# Patient Record
Sex: MALE | Race: WHITE | Employment: FULL TIME | URBAN - METROPOLITAN AREA
[De-identification: names, ages, dates, MRNs, and addresses within clinical notes are randomized per-mention and may not be internally consistent; named-entity substitution may affect disease eponyms.]

---

## 2021-01-28 ENCOUNTER — HOSPITAL ENCOUNTER (OUTPATIENT)
Dept: LAB | Age: 61
Discharge: HOME | End: 2021-01-28
Payer: COMMERCIAL

## 2021-01-28 PROCEDURE — 88305 TISSUE EXAM BY PATHOLOGIST: CPT

## 2021-01-28 PROCEDURE — 88344 IMHCHEM/IMCYTCHM EA MLT ANTB: CPT

## 2022-01-20 ENCOUNTER — HOSPITAL ENCOUNTER (OUTPATIENT)
Dept: PREADMISSION TESTING | Age: 62
Discharge: HOME | End: 2022-01-20
Payer: COMMERCIAL

## 2022-01-20 ENCOUNTER — HOSPITAL ENCOUNTER (OUTPATIENT)
Dept: PREADMISSION TESTING | Age: 62
Discharge: HOME | End: 2022-01-20

## 2022-01-20 LAB
ANION GAP SERPL CALC-SCNC: 4 MMOL/L (ref 3–18)
BUN SERPL-MCNC: 18 MG/DL (ref 7–18)
BUN/CREAT SERPL: 18 (ref 12–20)
CALCIUM SERPL-MCNC: 9.7 MG/DL (ref 8.5–10.1)
CHLORIDE SERPL-SCNC: 107 MMOL/L (ref 100–111)
CO2 SERPL-SCNC: 29 MMOL/L (ref 21–32)
CREAT SERPL-MCNC: 1 MG/DL (ref 0.6–1.3)
ERYTHROCYTE [DISTWIDTH] IN BLOOD BY AUTOMATED COUNT: 13 % (ref 11.6–14.5)
GLUCOSE SERPL-MCNC: 103 MG/DL (ref 74–99)
HCT VFR BLD AUTO: 44.1 % (ref 36–48)
HGB BLD-MCNC: 14.6 G/DL (ref 13–16)
MCH RBC QN AUTO: 29.1 PG (ref 24–34)
MCHC RBC AUTO-ENTMCNC: 33.1 G/DL (ref 31–37)
MCV RBC AUTO: 87.8 FL (ref 78–100)
NRBC # BLD: 0 K/UL (ref 0–0.01)
NRBC BLD-RTO: 0 PER 100 WBC
PLATELET # BLD AUTO: 174 K/UL (ref 135–420)
PMV BLD AUTO: 10.7 FL (ref 9.2–11.8)
POTASSIUM SERPL-SCNC: 4.6 MMOL/L (ref 3.5–5.5)
RBC # BLD AUTO: 5.02 M/UL (ref 4.35–5.65)
SODIUM SERPL-SCNC: 140 MMOL/L (ref 136–145)
WBC # BLD AUTO: 5.8 K/UL (ref 4.6–13.2)

## 2022-01-20 PROCEDURE — 36415 COLL VENOUS BLD VENIPUNCTURE: CPT

## 2022-01-20 PROCEDURE — U0003 INFECTIOUS AGENT DETECTION BY NUCLEIC ACID (DNA OR RNA); SEVERE ACUTE RESPIRATORY SYNDROME CORONAVIRUS 2 (SARS-COV-2) (CORONAVIRUS DISEASE [COVID-19]), AMPLIFIED PROBE TECHNIQUE, MAKING USE OF HIGH THROUGHPUT TECHNOLOGIES AS DESCRIBED BY CMS-2020-01-R: HCPCS

## 2022-01-20 PROCEDURE — 85027 COMPLETE CBC AUTOMATED: CPT

## 2022-01-20 PROCEDURE — 80048 BASIC METABOLIC PNL TOTAL CA: CPT

## 2022-01-21 VITALS — BODY MASS INDEX: 23.4 KG/M2 | HEIGHT: 69 IN | WEIGHT: 158 LBS

## 2022-01-21 LAB — SARS-COV-2, NAA: NOT DETECTED

## 2022-01-21 RX ORDER — BISMUTH SUBSALICYLATE 262 MG
1 TABLET,CHEWABLE ORAL DAILY
COMMUNITY

## 2022-01-21 RX ORDER — ASCORBIC ACID 500 MG
1000 TABLET ORAL DAILY
COMMUNITY

## 2022-01-21 RX ORDER — CEFAZOLIN SODIUM/WATER 2 G/20 ML
2 SYRINGE (ML) INTRAVENOUS ONCE
Status: CANCELLED | OUTPATIENT
Start: 2022-01-21 | End: 2022-01-21

## 2022-01-21 RX ORDER — DM/PE/ACETAMINOPHEN/CHLORPHENR 10-5-325-2
1 TABLET, SEQUENTIAL ORAL DAILY
COMMUNITY

## 2022-01-21 RX ORDER — SODIUM CHLORIDE, SODIUM LACTATE, POTASSIUM CHLORIDE, CALCIUM CHLORIDE 600; 310; 30; 20 MG/100ML; MG/100ML; MG/100ML; MG/100ML
125 INJECTION, SOLUTION INTRAVENOUS CONTINUOUS
Status: CANCELLED | OUTPATIENT
Start: 2022-01-21

## 2022-01-21 NOTE — PERIOP NOTES
No sleep apnea, removable prosthetic devices or family history of malignant hyperthermia. Care fusion kit and instructions given and reviewed. PCP is not aware of the surgery. No participation in clinical trial or research study. Do not bring any valuables on DOS- jewelry, wallet, cash, laptop, medications. CDC guidelines reviewed. Does not meet criteria for special population at this time. Possible time delay day of surgery reviewed. Covid cjkosep-7-. DNR status-none.

## 2022-01-25 ENCOUNTER — ANESTHESIA EVENT (OUTPATIENT)
Dept: SURGERY | Age: 62
End: 2022-01-25
Payer: COMMERCIAL

## 2022-01-25 ENCOUNTER — APPOINTMENT (OUTPATIENT)
Dept: GENERAL RADIOLOGY | Age: 62
End: 2022-01-25
Attending: UROLOGY
Payer: COMMERCIAL

## 2022-01-25 ENCOUNTER — ANESTHESIA (OUTPATIENT)
Dept: SURGERY | Age: 62
End: 2022-01-25
Payer: COMMERCIAL

## 2022-01-25 ENCOUNTER — APPOINTMENT (OUTPATIENT)
Dept: ULTRASOUND IMAGING | Age: 62
End: 2022-01-25
Attending: UROLOGY
Payer: COMMERCIAL

## 2022-01-25 ENCOUNTER — HOSPITAL ENCOUNTER (OUTPATIENT)
Age: 62
Setting detail: OUTPATIENT SURGERY
Discharge: HOME | End: 2022-01-25
Attending: UROLOGY | Admitting: UROLOGY
Payer: COMMERCIAL

## 2022-01-25 VITALS
RESPIRATION RATE: 14 BRPM | HEART RATE: 70 BPM | OXYGEN SATURATION: 99 % | BODY MASS INDEX: 24.51 KG/M2 | WEIGHT: 165.5 LBS | TEMPERATURE: 97.4 F | DIASTOLIC BLOOD PRESSURE: 78 MMHG | SYSTOLIC BLOOD PRESSURE: 119 MMHG | HEIGHT: 69 IN

## 2022-01-25 DIAGNOSIS — R97.20 ELEVATED PSA: ICD-10-CM

## 2022-01-25 DIAGNOSIS — N13.30 HYDRONEPHROSIS, LEFT: Primary | ICD-10-CM

## 2022-01-25 PROBLEM — C61 CANCER OF PROSTATE (HCC): Status: ACTIVE | Noted: 2022-01-25

## 2022-01-25 PROCEDURE — 74011250637 HC RX REV CODE- 250/637: Performed by: ANESTHESIOLOGY

## 2022-01-25 PROCEDURE — 74011000250 HC RX REV CODE- 250: Performed by: ANESTHESIOLOGY

## 2022-01-25 PROCEDURE — 88344 IMHCHEM/IMCYTCHM EA MLT ANTB: CPT

## 2022-01-25 PROCEDURE — 77030020782 HC GWN BAIR PAWS FLX 3M -B: Performed by: UROLOGY

## 2022-01-25 PROCEDURE — 76010000149 HC OR TIME 1 TO 1.5 HR: Performed by: UROLOGY

## 2022-01-25 PROCEDURE — 2709999900 HC NON-CHARGEABLE SUPPLY: Performed by: UROLOGY

## 2022-01-25 PROCEDURE — C1769 GUIDE WIRE: HCPCS | Performed by: UROLOGY

## 2022-01-25 PROCEDURE — 74011000636 HC RX REV CODE- 636: Performed by: UROLOGY

## 2022-01-25 PROCEDURE — 74011250636 HC RX REV CODE- 250/636: Performed by: UROLOGY

## 2022-01-25 PROCEDURE — 88305 TISSUE EXAM BY PATHOLOGIST: CPT

## 2022-01-25 PROCEDURE — 76210000024 HC REC RM PH II 2.5 TO 3 HR: Performed by: UROLOGY

## 2022-01-25 PROCEDURE — 76060000033 HC ANESTHESIA 1 TO 1.5 HR: Performed by: UROLOGY

## 2022-01-25 PROCEDURE — 77030013688 US GUIDE NDL PLC

## 2022-01-25 PROCEDURE — 77030012508 HC MSK AIRWY LMA AMBU -A: Performed by: ANESTHESIOLOGY

## 2022-01-25 PROCEDURE — C2617 STENT, NON-COR, TEM W/O DEL: HCPCS | Performed by: UROLOGY

## 2022-01-25 PROCEDURE — 74011250636 HC RX REV CODE- 250/636: Performed by: ANESTHESIOLOGY

## 2022-01-25 PROCEDURE — 74420 UROGRAPHY RTRGR +-KUB: CPT

## 2022-01-25 PROCEDURE — 77030040361 HC SLV COMPR DVT MDII -B: Performed by: UROLOGY

## 2022-01-25 PROCEDURE — 77030018846 HC SOL IRR STRL H20 ICUM -A: Performed by: UROLOGY

## 2022-01-25 PROCEDURE — C1758 CATHETER, URETERAL: HCPCS | Performed by: UROLOGY

## 2022-01-25 PROCEDURE — 76210000006 HC OR PH I REC 0.5 TO 1 HR: Performed by: UROLOGY

## 2022-01-25 DEVICE — VARIABLE LENGTH URETERAL STENT
Type: IMPLANTABLE DEVICE | Site: URETER | Status: FUNCTIONAL
Brand: CONTOUR VL™

## 2022-01-25 RX ORDER — DEXAMETHASONE SODIUM PHOSPHATE 4 MG/ML
INJECTION, SOLUTION INTRA-ARTICULAR; INTRALESIONAL; INTRAMUSCULAR; INTRAVENOUS; SOFT TISSUE AS NEEDED
Status: DISCONTINUED | OUTPATIENT
Start: 2022-01-25 | End: 2022-01-25 | Stop reason: HOSPADM

## 2022-01-25 RX ORDER — NALOXONE HYDROCHLORIDE 0.4 MG/ML
0.1 INJECTION, SOLUTION INTRAMUSCULAR; INTRAVENOUS; SUBCUTANEOUS
Status: CANCELLED | OUTPATIENT
Start: 2022-01-25

## 2022-01-25 RX ORDER — FENTANYL CITRATE 50 UG/ML
25 INJECTION, SOLUTION INTRAMUSCULAR; INTRAVENOUS
Status: CANCELLED | OUTPATIENT
Start: 2022-01-25

## 2022-01-25 RX ORDER — INSULIN LISPRO 100 [IU]/ML
INJECTION, SOLUTION INTRAVENOUS; SUBCUTANEOUS ONCE
Status: CANCELLED | OUTPATIENT
Start: 2022-01-25 | End: 2022-01-25

## 2022-01-25 RX ORDER — HYDROMORPHONE HYDROCHLORIDE 2 MG/ML
2 INJECTION, SOLUTION INTRAMUSCULAR; INTRAVENOUS; SUBCUTANEOUS ONCE
Status: DISCONTINUED | OUTPATIENT
Start: 2022-01-25 | End: 2022-01-25 | Stop reason: HOSPADM

## 2022-01-25 RX ORDER — PHENAZOPYRIDINE HYDROCHLORIDE 100 MG/1
100 TABLET, FILM COATED ORAL
Qty: 10 TABLET | Refills: 0 | Status: SHIPPED | OUTPATIENT
Start: 2022-01-25 | End: 2022-01-28

## 2022-01-25 RX ORDER — FENTANYL CITRATE 50 UG/ML
INJECTION, SOLUTION INTRAMUSCULAR; INTRAVENOUS AS NEEDED
Status: DISCONTINUED | OUTPATIENT
Start: 2022-01-25 | End: 2022-01-25 | Stop reason: HOSPADM

## 2022-01-25 RX ORDER — OXYCODONE AND ACETAMINOPHEN 5; 325 MG/1; MG/1
1 TABLET ORAL AS NEEDED
Status: CANCELLED | OUTPATIENT
Start: 2022-01-25

## 2022-01-25 RX ORDER — CEFAZOLIN SODIUM/WATER 2 G/20 ML
2 SYRINGE (ML) INTRAVENOUS ONCE
Status: COMPLETED | OUTPATIENT
Start: 2022-01-25 | End: 2022-01-25

## 2022-01-25 RX ORDER — LIDOCAINE HYDROCHLORIDE 20 MG/ML
INJECTION, SOLUTION EPIDURAL; INFILTRATION; INTRACAUDAL; PERINEURAL AS NEEDED
Status: DISCONTINUED | OUTPATIENT
Start: 2022-01-25 | End: 2022-01-25 | Stop reason: HOSPADM

## 2022-01-25 RX ORDER — SODIUM CHLORIDE, SODIUM LACTATE, POTASSIUM CHLORIDE, CALCIUM CHLORIDE 600; 310; 30; 20 MG/100ML; MG/100ML; MG/100ML; MG/100ML
50 INJECTION, SOLUTION INTRAVENOUS CONTINUOUS
Status: CANCELLED | OUTPATIENT
Start: 2022-01-25

## 2022-01-25 RX ORDER — TRAMADOL HYDROCHLORIDE 50 MG/1
50 TABLET ORAL
Qty: 20 TABLET | Refills: 0 | Status: SHIPPED | OUTPATIENT
Start: 2022-01-25 | End: 2022-01-30

## 2022-01-25 RX ORDER — HYDROMORPHONE HYDROCHLORIDE 1 MG/ML
0.5 INJECTION, SOLUTION INTRAMUSCULAR; INTRAVENOUS; SUBCUTANEOUS
Status: CANCELLED | OUTPATIENT
Start: 2022-01-25

## 2022-01-25 RX ORDER — ONDANSETRON 2 MG/ML
INJECTION INTRAMUSCULAR; INTRAVENOUS AS NEEDED
Status: DISCONTINUED | OUTPATIENT
Start: 2022-01-25 | End: 2022-01-25 | Stop reason: HOSPADM

## 2022-01-25 RX ORDER — CIPROFLOXACIN 250 MG/1
250 TABLET, FILM COATED ORAL EVERY 12 HOURS
COMMUNITY

## 2022-01-25 RX ORDER — ONDANSETRON 2 MG/ML
4 INJECTION INTRAMUSCULAR; INTRAVENOUS ONCE
Status: CANCELLED | OUTPATIENT
Start: 2022-01-25 | End: 2022-01-25

## 2022-01-25 RX ORDER — MAGNESIUM SULFATE 100 %
4 CRYSTALS MISCELLANEOUS AS NEEDED
Status: CANCELLED | OUTPATIENT
Start: 2022-01-25

## 2022-01-25 RX ORDER — ACETAMINOPHEN 500 MG
1000 TABLET ORAL ONCE
Status: COMPLETED | OUTPATIENT
Start: 2022-01-25 | End: 2022-01-25

## 2022-01-25 RX ORDER — PROPOFOL 10 MG/ML
INJECTION, EMULSION INTRAVENOUS AS NEEDED
Status: DISCONTINUED | OUTPATIENT
Start: 2022-01-25 | End: 2022-01-25 | Stop reason: HOSPADM

## 2022-01-25 RX ORDER — MIDAZOLAM HYDROCHLORIDE 1 MG/ML
INJECTION INTRAMUSCULAR; INTRAVENOUS AS NEEDED
Status: DISCONTINUED | OUTPATIENT
Start: 2022-01-25 | End: 2022-01-25 | Stop reason: HOSPADM

## 2022-01-25 RX ORDER — SODIUM CHLORIDE, SODIUM LACTATE, POTASSIUM CHLORIDE, CALCIUM CHLORIDE 600; 310; 30; 20 MG/100ML; MG/100ML; MG/100ML; MG/100ML
125 INJECTION, SOLUTION INTRAVENOUS CONTINUOUS
Status: DISCONTINUED | OUTPATIENT
Start: 2022-01-25 | End: 2022-01-25 | Stop reason: HOSPADM

## 2022-01-25 RX ORDER — GLYCOPYRROLATE 0.2 MG/ML
INJECTION INTRAMUSCULAR; INTRAVENOUS AS NEEDED
Status: DISCONTINUED | OUTPATIENT
Start: 2022-01-25 | End: 2022-01-25 | Stop reason: HOSPADM

## 2022-01-25 RX ORDER — EPHEDRINE SULFATE/0.9% NACL/PF 50 MG/5 ML
SYRINGE (ML) INTRAVENOUS AS NEEDED
Status: DISCONTINUED | OUTPATIENT
Start: 2022-01-25 | End: 2022-01-25 | Stop reason: HOSPADM

## 2022-01-25 RX ADMIN — Medication 10 MG: at 09:31

## 2022-01-25 RX ADMIN — DEXAMETHASONE SODIUM PHOSPHATE 4 MG: 4 INJECTION, SOLUTION INTRAMUSCULAR; INTRAVENOUS at 09:41

## 2022-01-25 RX ADMIN — ONDANSETRON HYDROCHLORIDE 4 MG: 2 INJECTION INTRAMUSCULAR; INTRAVENOUS at 10:19

## 2022-01-25 RX ADMIN — LIDOCAINE HYDROCHLORIDE 60 MG: 20 INJECTION, SOLUTION INTRAVENOUS at 09:28

## 2022-01-25 RX ADMIN — GLYCOPYRROLATE 0.3 MG: 0.2 INJECTION INTRAMUSCULAR; INTRAVENOUS at 09:43

## 2022-01-25 RX ADMIN — FENTANYL CITRATE 100 MCG: 50 INJECTION, SOLUTION INTRAMUSCULAR; INTRAVENOUS at 09:28

## 2022-01-25 RX ADMIN — Medication 2 G: at 09:30

## 2022-01-25 RX ADMIN — PROPOFOL 150 MG: 10 INJECTION, EMULSION INTRAVENOUS at 09:28

## 2022-01-25 RX ADMIN — MIDAZOLAM HYDROCHLORIDE 2 MG: 1 INJECTION, SOLUTION INTRAMUSCULAR; INTRAVENOUS at 09:21

## 2022-01-25 RX ADMIN — ACETAMINOPHEN 1000 MG: 500 TABLET ORAL at 11:39

## 2022-01-25 NOTE — BRIEF OP NOTE
Brief Postoperative Note    Patient: Abiola Larsen  YOB: 1960  MRN: [id number]    Date of Procedure: 1/25/2022     Pre-Op Diagnosis: PROSTATE CANCER,  LEFT HYDRONEPHROSIS    Post-Op Diagnosis: Same as preoperative diagnosis. Procedure(s):  CYSTOSCOPY,LEFT RETROGRADE PYELOGRAM WITH INTERPRETATION,  LEFT DIAGNOSTIC URETEROSCOPY AND STENT PLACEMENT,  TRANSRECTAL ULTRASOUND, PROSTATE BIOPSY    Surgeon(s):  Snehal Reynolds MD    Surgical Assistant: None    Anesthesia: General     Estimated Blood Loss (mL): Minimal    Complications: None    Specimens:   ID Type Source Tests Collected by Time Destination   1 : PROSTATE BIOPSY- LEFT BASE Preservative Prostate  Snehal Reynolds MD 1/25/2022 8147 Pathology   2 : PROSTATE BIOPSY- LEFT MIDDLE Preservative Prostate  Snehal Reynolds MD 1/25/2022 1000 Pathology   3 : PROSTATE BIOPSY- LEFT APEX Preservative Prostate  Snehal Reynolds MD 1/25/2022 1000 Pathology   4 : PROSTATE BIOPSY- RIGHT BASE Preservative Prostate  Snehal Reynolds MD 1/25/2022 1000 Pathology   5 : PROSTATE BIOPSY- RIGHT MIDDLE Preservative Prostate  Snehal Reynolds MD 1/25/2022 1000 Pathology   6 : PROSTATE BIOPSY- RIGHT APEX Preservative Prostate  Snehal Reynolds MD 1/25/2022 1000 Pathology        Implants:   Implant Name Type Inv. Item Serial No.  Lot No. LRB No. Used Action   STENT URET 4.8FR [id number] PERCFLX HYDR+ SFT PGTL TAPR TIP - [id number]  STENT URET 4.8FR [id number] PERCFLX HYDR+ SFT PGTL TAPR TIP  [facility] [facility]_ [id number] Left 1 Implanted       Drains: * No LDAs found *    Findings: PATIENT WITH A VERY LARGE PROSTATE WITH MEDIAN LOBE. GRADE 2 BLADDER TRABECULATIONS,   A HOOKED TIGHT UO/DISTAL URETER WITH A VERY DILATED DISTAL URETER AND THEN NORMAL CALIBER MIDDLE AND PROXIMAL URETER WITHOUT MASS OR BLUNTING OF CALYCES. PROSTATE WAS 44.5 GRAMS. NO HYPOECHOIC LESIONS     DILATED SV ON RIGHT SIDE.       Electronically Signed by Enedina Arnett MD on 1/25/2022 at 10:41 AM

## 2022-01-25 NOTE — ANESTHESIA POSTPROCEDURE EVALUATION
Post-Anesthesia Evaluation and Assessment    Cardiovascular Function/Vital Signs  Visit Vitals  /72   Pulse 89   Temp 36.3 °C (97.4 °F)   Resp 11   Ht 5' 9\" (1.753 m)   Wt 75.1 kg (165 lb 8 oz)   SpO2 100%   BMI 24.44 kg/m²       Patient is status post Procedure(s):  CYSTOSCOPY,LEFT RETROGRADE PYELOGRAM WITH INTERPRETATION,  LEFT DIAGNOSTIC URETEROSCOPY AND STENT PLACEMENT, PROSTATE ULTRASOUND WITH TRANSRECTAL ULTRASOUND PROSTATE BIOPSY, WITH C-ARM. Nausea/Vomiting: Controlled. Postoperative hydration reviewed and adequate. Pain:  Pain Scale 1: Numeric (0 - 10) (01/25/22 1046)  Pain Intensity 1: 0 (01/25/22 1046)   Managed. Neurological Status:   Neuro (WDL): Exceptions to WDL (01/25/22 1046)   At baseline. Mental Status and Level of Consciousness: Baseline and stable. Pulmonary Status:   O2 Device: None (Room air) (01/25/22 1046)   Adequate oxygenation and airway patent. Complications related to anesthesia: None    Post-anesthesia assessment completed. No concerns. Patient has met all discharge requirements.     Signed By: Tenisha Henley MD

## 2022-01-25 NOTE — PERIOP NOTES
TRANSFER - OUT REPORT:    Verbal report given to Nkechi Paulson RN(name) on Laney Bledsoe  being transferred to phase 2(unit) for routine post - op       Report consisted of patients Situation, Background, Assessment and   Recommendations(SBAR). Information from the following report(s) SBAR, Kardex, OR Summary, Procedure Summary, Intake/Output and MAR was reviewed with the receiving nurse. Lines:   Peripheral IV 01/25/22 Posterior;Right Hand (Active)   Site Assessment Clean, dry, & intact 01/25/22 1047   Phlebitis Assessment 0 01/25/22 1047   Infiltration Assessment 0 01/25/22 1047   Dressing Status Clean, dry, & intact 01/25/22 1047   Dressing Type Transparent;Tape 01/25/22 1047   Hub Color/Line Status Pink; Infusing;Patent 01/25/22 0841        Opportunity for questions and clarification was provided.       Patient transported with:   Registered Nurse  Tech

## 2022-01-25 NOTE — PERIOP NOTES
Reviewed PTA medication list with patient/caregiver and patient/caregiver denies any additional medications. Patient admits to having a responsible adult care for them at home for at least 24 hours after surgery. Patient encouraged to use gown warming system and informed that using said warming gown to regulate body temperature prior to a procedure has been shown to help reduce the risks of blood clots and infection. Patient's pharmacy of choice verified and documented in PTA medication section. Dual skin assessment & fall risk band verification completed with Laura Lozano RN.

## 2022-01-25 NOTE — PERIOP NOTES
TRANSFER - IN REPORT:    Verbal report received from Arcadio Chapa RN(name) on Tone Henriquez  being received from [facility]) for routine progression of care      Report consisted of patients Situation, Background, Assessment and   Recommendations(SBAR). Information from the following report(s) SBAR, Kardex, OR Summary and MAR was reviewed with the receiving nurse. Opportunity for questions and clarification was provided. Assessment completed upon patients arrival to unit and care assumed.            Paged Dr. Andrey Alexandra for pain medications

## 2022-01-25 NOTE — DISCHARGE INSTRUCTIONS
DISCHARGE SUMMARY from Nurse  Increase fluids today  Advance diet as tolerated  Call Dr. Landon Murillo if you develop a fever over 101, chills, uncontrolled nausea, vomiting, and/ or pain. Call Dr. Landon Murillo if you cannot urinate, have large blood clots in urine, or have thick urine      PATIENT INSTRUCTIONS:    After general anesthesia or intravenous sedation, for 24 hours or while taking prescription Narcotics:  · Limit your activities  · Do not drive and operate hazardous machinery  · Do not make important personal or business decisions  · Do  not drink alcoholic beverages  · If you have not urinated within 8 hours after discharge, please contact your surgeon on call. Report the following to your surgeon:  · Excessive pain, swelling, redness or odor of or around the surgical area  · Temperature over 100.5  · Nausea and vomiting lasting longer than 4 hours or if unable to take medications  · Any signs of decreased circulation or nerve impairment to extremity: change in color, persistent  numbness, tingling, coldness or increase pain  · Any questions    What to do at Home:  Recommended activity: Activity as tolerated and no driving for today,     If you experience any of the following symptoms as noted above, please follow up with Dr. Landon Murillo. *  Please give a list of your current medications to your Primary Care Provider. *  Please update this list whenever your medications are discontinued, doses are      changed, or new medications (including over-the-counter products) are added. *  Please carry medication information at all times in case of emergency situations. These are general instructions for a healthy lifestyle:    No smoking/ No tobacco products/ Avoid exposure to second hand smoke  Surgeon General's Warning:  Quitting smoking now greatly reduces serious risk to your health.     Obesity, smoking, and sedentary lifestyle greatly increases your risk for illness    A healthy diet, regular physical exercise & weight monitoring are important for maintaining a healthy lifestyle    You may be retaining fluid if you have a history of heart failure or if you experience any of the following symptoms:  Weight gain of 3 pounds or more overnight or 5 pounds in a week, increased swelling in our hands or feet or shortness of breath while lying flat in bed. Please call your doctor as soon as you notice any of these symptoms; do not wait until your next office visit. The discharge information has been reviewed with the patient and caregiver. The patient and caregiver verbalized understanding. Discharge medications reviewed with the patient and caregiver and appropriate educational materials and side effects teaching were provided.   ___________________________________________________________________________________________________________________________________    Patient armband removed and shredded

## 2022-01-25 NOTE — ANESTHESIA PREPROCEDURE EVALUATION
Relevant Problems   No relevant active problems       Anesthetic History     PONV          Review of Systems / Medical History  Patient summary reviewed, nursing notes reviewed and pertinent labs reviewed    Pulmonary  Within defined limits                 Neuro/Psych   Within defined limits           Cardiovascular  Within defined limits                     GI/Hepatic/Renal  Within defined limits              Endo/Other  Within defined limits           Other Findings              Physical Exam    Airway  Mallampati: II  TM Distance: 4 - 6 cm  Neck ROM: normal range of motion   Mouth opening: Normal     Cardiovascular  Regular rate and rhythm,  S1 and S2 normal,  no murmur, click, rub, or gallop             Dental         Pulmonary  Breath sounds clear to auscultation               Abdominal  GI exam deferred       Other Findings            Anesthetic Plan    ASA: 2  Anesthesia type: general          Induction: Intravenous  Anesthetic plan and risks discussed with: Patient

## 2022-01-25 NOTE — OP NOTES
[facility] [place]  OPERATIVE REPORT    Name:  Yael Woodruff  MR#:   [id number]  :  1960  ACCOUNT #:  [de-identified]  DATE OF SERVICE:  2022    PREOPERATIVE DIAGNOSES:  Prostate cancer and left hydronephrosis. POSTOPERATIVE DIAGNOSES:  Prostate cancer and left hydronephrosis. PROCEDURES PERFORMED:  Cystoscopy, left retrograde pyelogram with interpretation, left diagnostic ureteroscopy and stent placement with prostate ultrasound with prostate biopsy. SURGEON:  Fiorella Garcia MD    ASSISTANT:  None. ANESTHESIA:  General.    COMPLICATIONS:  None. SPECIMENS REMOVED:  Prostate biopsy, left base; prostate biopsy, left middle; prostate biopsy, left apex; prostate biopsy, right base; prostate biopsy, right middle; and prostate biopsy, right apex. IMPLANTS:  A 4.8-Setswana variable-length stent. DRAINS:  None. ESTIMATED BLOOD LOSS:  Minimal.    FINDINGS:  The patient had a very large prostate with significant median lobe. He had grade II bladder trabeculations throughout the bladder. He had a hooked distal ureter/tight UO with a very dilated distal ureter just proximal to the hooking, and then the ureter became a normal caliber by the middle of the ureter and proximal ureter without any mass or blunting of calyces or abnormalities in the kidney itself. The prostate ultrasound was 44.5 g in mass. There were no hypoechoic lesions. There was a dilated seminal vesicles on the right side. CLINICAL NOTE/INDICATIONS:  The patient is a 62-year-old gentleman with a history of prostate cancer, who presents for surveillance biopsy. As part of his active surveillance protocol, an MRI was done and this demonstrated an absent right kidney in it, a dilated left distal ureter. He presents for further evaluation. PROCEDURE:  Preoperatively, the risks and benefits of the surgery were described to the patient.   The risks include, but are not limited to, bleeding, infection, injury to the bladder, injury to the ureter, injury to the kidney, and possible need for future procedures. The patient understood the risks and signed the informed consent. The patient was taken to the operating room and placed on the OR table in supine position. He was administered general anesthetic. He was administered intravenous antibiotics. He was placed in dorsal lithotomy position and prepped and draped in the usual sterile manner. A 22-Nepali resectoscope and sheath were then inserted transurethrally atraumatically under direct vision using a 30-degree lens. The urethra was normal.  When I got to the prostate, the prostate showed significant enlargement with a very large median lobe obstructing. Once in the bladder, there were grade II bladder trabeculations throughout the bladder. There were no stones, no tumors, no areas of concern. The left ureteral orifice was in normal anatomic position. The right ureteral orifice was absent. I then cannulated the left ureteral orifice with a 5-Nepali open-ended ureteral catheter. Retrograde pyelogram was done in the usual manner using 18 mL of Visipaque dye. There were no filling defects. However, there was a little bit of hooking of the distal left ureter. Proximal to this hooking, there was a significantly dilated distal left ureter. However, it tapered down to a normal size by the mid ureter and the proximal ureter, which were completely normal without any filling defects seen. There was no simba hydronephrosis or blunting of calyces just really this very significant hydroureter in the distal aspect only. Next, I passed a Sensor wire through the open-ended catheter up to the kidney. The open-ended catheter was removed. The scope was removed. I then passed a dual-lumen catheter and passed a second Sensor wire up to the kidney. The dual-lumen catheter was removed. One wire was a safety wire and the other wire was a working wire.   Over the working wire, I passed a flexible ureteroscope. When I got to the UO, it was somewhat difficult due to the tightness of it, but I was able to advance my scope by it and up to the kidney. The working wire was removed. Panpyeloscopy was done. Every hodan was entered. There were no tumors, no stones, no areas of concern. Panpyeloscopy was done. I then slowly backed the scope down the ureter. The ureter was intact and unharmed. There were no tumors in the ureter. There were no stones in the ureter. The dilation was persistent, but it was nonobstructive. Next, I removed the scope completely. I reinserted the cystoscope. Over the safety wire, I passed a 4.8-Kyrgyz variable-length stent. The wire was removed. Fluoroscopy confirmed that the proximal coil was in the kidney. The distal coil was noted to be in the bladder by direct vision. At this point, I removed the scope and all of the drapings. I then passed a transrectal ultrasound probe transrectally and imaged the prostate in transverse and longitudinal views. There were no hypoechoic lesions, but there were some dilated seminal vesicles on the right side in particular. The prostate was measured using an ellipsoid formula. It was at 44.5 mL in volume. The prostate biopsies were done with two at the left base and then two in the left mid and two at the left apex in the usual manner under ultrasound guidance. I then did two biopsies at the right base, two biopsies at the right mid, and two biopsies at the right apex. There was minimal bleeding. I did hold transrectal pressure for one minute and then packed the rectum with gauze. At this point, the patient was taken out of lithotomy position and revived from anesthesia, and taken to Recovery in stable condition.       MD GADIEL [email or web address]  D:  01/25/2022 10:52  T:  01/25/2022 11:24  JOB #:  [id number]

## 2022-01-25 NOTE — H&P
Urology [address] [place] [address] [address] [address]  Tel: [phone]  Fax: [phone]    Patient : Vicente Cespedes   YOB: 1960           Assessment/Plan  # Detail Type Description    1. Assessment Cancer of prostate (C61). Patient Plan We again discussed his plight in detail. He is due for a surveillance bx. He has Cipro and Enema on hand. He will proceed with a bx in the OR in the next couple of weeks. Plan Orders Urine Culture, Routine to be performed. Obtained on 01/05/2022.         2. Assessment Hydronephrosis, left (N13.30). Patient Plan He has persistent left hydro ureter on a solitary left kidney. We discussed this could be due to a stricture or it could be due to a mass or it could be a variation of normal.  We discussed how a cysto, left RPG, possible left URS, possible ureteral dilation, biopsy and stent placement is done. We discussed the risk of bleeding, infection, injury and possible need for future procedures, the fact that this is potentially both diagnostic and potentially a curative procedure. He expressed understanding and will proceed. 3. Assessment Enlarged prostate w/ LUTS (N40.1). Patient Plan He is currently voiding okay. 4. Assessment Feeling of incomplete bladder emptying (R39.14). Patient Plan PVR next visit. Additional Visit Information      This 63year old male presents for Prostate Cancer and Hydronephrosis. History of Present Illness  1. Prostate Cancer   The patient is here today for scheduled follow up. The patient's status is stable. The patient  denies chills, diarrhea, a fever, headache, nausea, sexual dysfunction or vomiting. Pertinent history does not include a family history of prostate cancer or a diet high in animal fat.   Additional information: PSA = 4.27 (9/25/2020)     4.3 (11/23/2020)     3.40 (4/2021)   3.73 (7/2021)   4.76 (11/2021)      Pathology (01/28/2021):  [place] 3+3=6 and ASAP. He has been on active surveillance but he leans towards surgery should he need treatment      2022 -- He is doing well overall. No pain or radiation. mri (2021) - No PiRADS lesions, but some inflammation, distal left ureteral narrowing with proximal hydroureter. 2.  Hydronephrosis   Onset was 6 months ago. Severity level is no pain. The problem is with no change. The pain does not radiate. Pertinent negatives include chills, constipation, diarrhea, fever, nausea and vomiting. Additional information: MRI (pelvis [facility], personally reviewed, 2021) = left distal ureteral narrowing with proximal hydro ureter. .            Comments: CT (2021) -- personally reviewed -- absent right kidney, Left distal ureteral narrowing with hydroureter proximal to it and not frak hydronephrosis nor any filling defects otherwise. 2022 -- He is ding well without pain or hematuria. He has a lot of questions about the procedure. Past Medical/Surgical History   (Reviewed, updated)  Disease/disorder Onset Date Management Date Comments   hemorrhoids         hemorrhoids          Problem List  Problem List reviewed. Medications (active prior to today)  Medication Instructions Start Date Stop Date Refilled Elsewhere   Cipro 500 mg tablet take 1 by oral route once for 12 hours start day prior to biopsy 2021   N       Medication Reconciliation  Medications reconciled today. Medication Reviewed  Adherence Medication Name Sig Desc Elsewhere Status   taking as directed Cipro 500 mg tablet take 1 by oral route once for 12 hours start day prior to biopsy N Verified     Allergies  Ingredient Reaction (Severity) Medication Name Comment   NO KNOWN ALLERGIES        Reviewed, no changes.       Family History   (Reviewed, updated)    Relationship Family Member Name  Age at Death Condition Onset Age Cause of Death   Father    Cancer -melanoma 61 (cause of death)      Social History (Reviewed, updated)  Tobacco use reviewed. Preferred language is [place]. Marital Status/Family/Social Support  Marital status:      Tobacco use status: Current non-smoker. Smoking status: Never smoker. Tobacco Screening  Patient has never used tobacco. Patient has not used tobacco in the last 30 days. Patient has not used smokeless tobacco in the last 30 days. Smoking Status  Type Smoking Status Usage Per Day Years Used Pack Years Total Pack Years    Never smoker         Alcohol  There is a history of alcohol use. consumed rarely. Caffeine  The patient uses caffeine: coffee - 4 cups a day. Review of Systems  System Neg/Pos Details   Constitutional Negative Chills and Fever. ENMT Negative Ear infections and Sore throat. Eyes Negative Blurred vision, Double vision and Eye pain. Respiratory Negative Asthma, Chronic cough, Dyspnea and Wheezing. Cardio Negative Chest pain. GI Negative Constipation, Decreased appetite, Diarrhea, Nausea and Vomiting. Endocrine Negative Cold intolerance, Heat intolerance, Increased thirst and Weight loss. Neuro Negative Headache and Tremors. Psych Negative Anxiety and Depression. Integumentary Negative Itching skin and Rash. MS Negative Back pain and Joint pain. Hema/Lymph Negative Easy bleeding. Reproductive Negative Sexual dysfunction. Vital Signs   Height  Time ft in cm Last Measured Height Position   8:49 AM 5.0 9.00 175.26 01/14/2021 0     Weight/BSA/BMI  Time lb oz kg Context BMI kg/m2 BSA m2   8:49 .80  76.566  24.93      Measured by  Time Measured by   8:49 AM [place] Day     Physical Exam  Exam Findings Details   Constitutional Normal Well developed. Neck Exam Normal Inspection - Normal.   Respiratory Normal Inspection - Normal.   Abdomen Normal No abdominal tenderness. Genitourinary Normal No CVA tenderness. No suprapubic tenderness. Extremity Normal No edema.    Psychiatric Normal Orientation - Oriented to time, place, person & situation. Appropriate mood and affect.      Medications (added, continued, or stopped today)  Start Date Medication Directions PRN Status PRN Reason Instruction Stop Date   12/06/2021 Cipro 500 mg tablet take 1 by oral route once for 12 hours start day prior to biopsy N  Start taking on the day prior to the biopsy    01/05/2022 tamsulosin 0.4 mg capsule take 1 capsule by oral route  every day 1/2 hour following the same meal each day N          Active Patient Care Team Members  Name Contact Agency Type Support Role Relationship Active Date Inactive Date Specialty   Ida Ellis   Patient provider PCP      Rabia Rodriguez   Emergency Contact Spouse          Provider:        Gerardo Sifuentes MD

## 2022-03-18 PROBLEM — N13.30 HYDRONEPHROSIS, LEFT: Status: ACTIVE | Noted: 2022-01-25

## 2022-03-19 PROBLEM — C61 CANCER OF PROSTATE (HCC): Status: ACTIVE | Noted: 2022-01-25

## 2024-01-22 RX ORDER — LIDOCAINE HYDROCHLORIDE 10 MG/ML
10 INJECTION, SOLUTION EPIDURAL; INFILTRATION; INTRACAUDAL; PERINEURAL ONCE
Status: COMPLETED | OUTPATIENT
Start: 2024-02-09 | End: 2024-02-09

## 2024-02-09 ENCOUNTER — HOSPITAL ENCOUNTER (OUTPATIENT)
Facility: HOSPITAL | Age: 64
End: 2024-02-09
Payer: COMMERCIAL

## 2024-02-09 DIAGNOSIS — R97.20 ELEVATED PSA: ICD-10-CM

## 2024-02-09 PROCEDURE — 88305 TISSUE EXAM BY PATHOLOGIST: CPT

## 2024-02-09 PROCEDURE — 55700 US BIOPSY PROSTATE NEEDLE/PUNCH: CPT | Performed by: UROLOGY

## 2024-02-09 PROCEDURE — 76942 ECHO GUIDE FOR BIOPSY: CPT

## 2024-02-09 PROCEDURE — 2500000003 HC RX 250 WO HCPCS: Performed by: UROLOGY

## 2024-02-09 RX ADMIN — LIDOCAINE HYDROCHLORIDE ANHYDROUS 10 ML: 10 INJECTION, SOLUTION INFILTRATION at 13:57

## 2024-02-09 NOTE — OP NOTE
Operative Note      Patient: Jr Young  YOB: 1960  MRN: [id number]    Date of Procedure: 2/9/2024    Pre Op diagnosis: Elevated PSA    Post-Op Diagnosis: Same       Procedure: Transrectal ultrasound and prostate biopsy    Surgeon: Woody Mendoza    Assistant:   * No surgical staff found *    Anesthesia: * No anesthesia type entered *    Estimated Blood Loss (mL): Minimal    Complications: None    Specimens:   Prostate biopsy    Implants:  * No implants in log *      Drains: * No LDAs found *    Findings: Prostate volume 46.36          The patient brought in the ultrasound suite. He was placed in lithotomy position.   Groin and genitalia were prepped and draped   in a clean fashion. I began using a transrectal ultrasound to perform   ultrasound of the prostate. The prostate volume was noted to be 46.36 mL in volume. The capsule of the prostate was normal, the seminal vesicles were normal No other abnormalities were seen in the prostate.  Using the ultrasound as guidance,  after admistration of 10 ml of lidocaine,12 biopsies were taken, 6 on the left side including 2 in the apex, 2 in the mid gland, 2 at the base. The same was performed on the right Side. Once the procedure was completed, the ultrasound was removed. There was no evidence of any active bleeding. At this time the pt was discharged in stable condition    Electronically signed by Woody Mendoza MD on 2/9/2024 at 1:48 PM

## 2024-02-09 NOTE — PROGRESS NOTES
Ultrasound Guided Prostate Biopsy with Dr. Mendoza. Patient tolerated procedure well and was escorted out to pavilion in stable condition.

## (undated) DEVICE — CYSTO PACK: Brand: MEDLINE INDUSTRIES, INC.

## (undated) DEVICE — SOL IRR STRL H2O 1500ML BTL --

## (undated) DEVICE — CATH URET AXXCESS 6FRX70CM -- BX/10

## (undated) DEVICE — STRAP,POSITIONING,KNEE/BODY,FOAM,4X60": Brand: MEDLINE

## (undated) DEVICE — GARMENT,MEDLINE,DVT,INT,CALF,MED, GEN2: Brand: MEDLINE

## (undated) DEVICE — DRAPE XR C ARM 41X74IN LF --

## (undated) DEVICE — POUCH DRNGE FLX BND INTEGR RAIL CLMP DISP EZ CTCH

## (undated) DEVICE — SOLUTION IRRIG 3000ML 0.9% SOD CHL FLX CONT 0797208] ICU MEDICAL INC]

## (undated) DEVICE — GDWIRE 3CM FLX-TIP 0.038X150CM -- BX/5 SENSOR

## (undated) DEVICE — MEDI-VAC NON-CONDUCTIVE SUCTION TUBING: Brand: CARDINAL HEALTH